# Patient Record
Sex: MALE | Race: BLACK OR AFRICAN AMERICAN
[De-identification: names, ages, dates, MRNs, and addresses within clinical notes are randomized per-mention and may not be internally consistent; named-entity substitution may affect disease eponyms.]

---

## 2018-04-06 ENCOUNTER — HOSPITAL ENCOUNTER (INPATIENT)
Dept: HOSPITAL 12 - ER | Age: 78
LOS: 6 days | Discharge: NURSING, REHAB OR LONG-TERM CARE | DRG: 885 | End: 2018-04-12
Attending: INTERNAL MEDICINE
Payer: MEDICARE

## 2018-04-06 VITALS — WEIGHT: 263 LBS | HEIGHT: 70 IN | BODY MASS INDEX: 37.65 KG/M2

## 2018-04-06 VITALS — DIASTOLIC BLOOD PRESSURE: 86 MMHG | SYSTOLIC BLOOD PRESSURE: 132 MMHG

## 2018-04-06 DIAGNOSIS — B35.3: ICD-10-CM

## 2018-04-06 DIAGNOSIS — F20.0: Primary | ICD-10-CM

## 2018-04-06 DIAGNOSIS — N18.9: ICD-10-CM

## 2018-04-06 DIAGNOSIS — F31.9: ICD-10-CM

## 2018-04-06 DIAGNOSIS — H54.40: ICD-10-CM

## 2018-04-06 DIAGNOSIS — B35.1: ICD-10-CM

## 2018-04-06 DIAGNOSIS — I12.9: ICD-10-CM

## 2018-04-06 DIAGNOSIS — D63.1: ICD-10-CM

## 2018-04-06 DIAGNOSIS — N25.81: ICD-10-CM

## 2018-04-06 DIAGNOSIS — R26.2: ICD-10-CM

## 2018-04-06 DIAGNOSIS — E44.0: ICD-10-CM

## 2018-04-06 DIAGNOSIS — E11.51: ICD-10-CM

## 2018-04-06 DIAGNOSIS — E78.5: ICD-10-CM

## 2018-04-06 DIAGNOSIS — F41.9: ICD-10-CM

## 2018-04-06 DIAGNOSIS — E66.9: ICD-10-CM

## 2018-04-06 DIAGNOSIS — Z79.899: ICD-10-CM

## 2018-04-06 DIAGNOSIS — E11.22: ICD-10-CM

## 2018-04-06 DIAGNOSIS — E11.65: ICD-10-CM

## 2018-04-06 LAB
ALP SERPL-CCNC: 112 U/L (ref 50–136)
ALT SERPL W/O P-5'-P-CCNC: 20 U/L (ref 16–63)
AMPHETAMINES UR QL SCN>1000 NG/ML: NEGATIVE
APAP SERPL-MCNC: < 2 UG/ML (ref 10–30)
APPEARANCE UR: CLEAR
AST SERPL-CCNC: 9 U/L (ref 15–37)
BARBITURATES UR QL SCN: NEGATIVE
BASOPHILS # BLD AUTO: 0.1 K/UL (ref 0–8)
BASOPHILS NFR BLD AUTO: 0.9 % (ref 0–2)
BILIRUB DIRECT SERPL-MCNC: 0.1 MG/DL (ref 0–0.2)
BILIRUB SERPL-MCNC: 0.3 MG/DL (ref 0.2–1)
BILIRUB UR QL STRIP: NEGATIVE
BUN SERPL-MCNC: 54 MG/DL (ref 7–18)
CHLORIDE SERPL-SCNC: 102 MMOL/L (ref 98–107)
CO2 SERPL-SCNC: 19 MMOL/L (ref 21–32)
COCAINE UR QL SCN: NEGATIVE
COLOR UR: YELLOW
CREAT SERPL-MCNC: 4.1 MG/DL (ref 0.6–1.3)
EOSINOPHIL # BLD AUTO: 0.1 K/UL (ref 0–0.7)
EOSINOPHIL NFR BLD AUTO: 0.8 % (ref 0–7)
ETHANOL SERPL-MCNC: < 3 MG/DL (ref 0–0)
GLUCOSE SERPL-MCNC: 124 MG/DL (ref 74–106)
GLUCOSE UR STRIP-MCNC: NEGATIVE MG/DL
HCT VFR BLD AUTO: 36.3 % (ref 36.7–47.1)
HGB BLD-MCNC: 12 G/DL (ref 12.5–16.3)
HGB UR QL STRIP: (no result)
KETONES UR STRIP-MCNC: NEGATIVE MG/DL
LEUKOCYTE ESTERASE UR QL STRIP: NEGATIVE
LYMPHOCYTES # BLD AUTO: 1.4 K/UL (ref 20–40)
LYMPHOCYTES NFR BLD AUTO: 16.5 % (ref 20.5–51.5)
MCH RBC QN AUTO: 28.6 UUG (ref 23.8–33.4)
MCHC RBC AUTO-ENTMCNC: 33 G/DL (ref 32.5–36.3)
MCV RBC AUTO: 86.3 FL (ref 73–96.2)
MONOCYTES # BLD AUTO: 0.6 K/UL (ref 2–10)
MONOCYTES NFR BLD AUTO: 6.4 % (ref 0–11)
MUCOUS THREADS URNS QL MICRO: (no result) /LPF
NEUTROPHILS # BLD AUTO: 6.6 K/UL (ref 1.8–8.9)
NEUTROPHILS NFR BLD AUTO: 75.4 % (ref 38.5–71.5)
NITRITE UR QL STRIP: NEGATIVE
OPIATES UR QL SCN: NEGATIVE
PCP UR QL SCN>25 NG/ML: NEGATIVE
PH UR STRIP: 5.5 [PH] (ref 5–8)
PLATELET # BLD AUTO: 278 K/UL (ref 152–348)
POTASSIUM SERPL-SCNC: 4.4 MMOL/L (ref 3.5–5.1)
PROT UR QL STRIP: (no result)
RBC # BLD AUTO: 4.2 MIL/UL (ref 4.06–5.63)
SP GR UR STRIP: 1.02 (ref 1–1.03)
THC UR QL SCN>50 NG/ML: NEGATIVE
UROBILINOGEN UR STRIP-MCNC: 0.2 E.U./DL
WBC # BLD AUTO: 8.7 K/UL (ref 3.6–10.2)
WBC #/AREA URNS HPF: (no result) /HPF (ref 0–3)
WS STN SPEC: 8.1 G/DL (ref 6.4–8.2)

## 2018-04-06 PROCEDURE — G0480 DRUG TEST DEF 1-7 CLASSES: HCPCS

## 2018-04-06 PROCEDURE — A4663 DIALYSIS BLOOD PRESSURE CUFF: HCPCS

## 2018-04-06 NOTE — NUR
PATIENT IS CALM NOW, NO FURTHER YELLING OR SUDDEN MOVEMENTS.  SP02 >99 % ON RA. 
 HE IS SITTING UP QUIETLY WATCVHING TV.  AWAITING TEST RESULTS.

## 2018-04-06 NOTE — NUR
PATIENT HERE FROM A NURSING FACILITY.  HE IS AWAKE. HE IS COMBATIVE AND YELLING 
OUT INAPPRORIATE THINGS.   HE IS IN RESTRAINTS FROM THE AMBULANCE.  HE IS GOING 
TO ROOM 5.

## 2018-04-06 NOTE — NUR
Gps/Lvn- Received patient from ER via [facility].Patient is lethargic, difficulty following 
directions. Incontinent of urine noted, diaper applied. Speech slurred, when tried to 
awakened patient, unable to weight patient at this time, r/t lethargy, unable to follow 
directions. Bed alarm on. Called Norma FLOYD, left message. Routine admission care 
done.Unable to feed patient lethargic.MRSA swab done, urine specimen obtained sent to lab.

## 2018-04-07 VITALS — SYSTOLIC BLOOD PRESSURE: 147 MMHG | DIASTOLIC BLOOD PRESSURE: 86 MMHG

## 2018-04-07 VITALS — DIASTOLIC BLOOD PRESSURE: 89 MMHG | SYSTOLIC BLOOD PRESSURE: 146 MMHG

## 2018-04-07 VITALS — DIASTOLIC BLOOD PRESSURE: 75 MMHG | SYSTOLIC BLOOD PRESSURE: 160 MMHG

## 2018-04-07 VITALS — SYSTOLIC BLOOD PRESSURE: 164 MMHG | DIASTOLIC BLOOD PRESSURE: 84 MMHG

## 2018-04-07 LAB
ALP SERPL-CCNC: 106 U/L (ref 50–136)
ALT SERPL W/O P-5'-P-CCNC: 21 U/L (ref 16–63)
AST SERPL-CCNC: 22 U/L (ref 15–37)
BASOPHILS # BLD AUTO: 0 K/UL (ref 0–8)
BASOPHILS NFR BLD AUTO: 0.5 % (ref 0–2)
BILIRUB SERPL-MCNC: 0.3 MG/DL (ref 0.2–1)
BUN SERPL-MCNC: 53 MG/DL (ref 7–18)
CHLORIDE SERPL-SCNC: 99 MMOL/L (ref 98–107)
CHOLEST SERPL-MCNC: 172 MG/DL (ref ?–200)
CO2 SERPL-SCNC: 22 MMOL/L (ref 21–32)
CREAT SERPL-MCNC: 4.1 MG/DL (ref 0.6–1.3)
EOSINOPHIL # BLD AUTO: 0.2 K/UL (ref 0–0.7)
EOSINOPHIL NFR BLD AUTO: 2 % (ref 0–7)
GLUCOSE SERPL-MCNC: 82 MG/DL (ref 74–106)
HCT VFR BLD AUTO: 36.1 % (ref 36.7–47.1)
HDLC SERPL-MCNC: 40 MG/DL (ref 40–60)
HGB BLD-MCNC: 11.8 G/DL (ref 12.5–16.3)
LYMPHOCYTES # BLD AUTO: 2.2 K/UL (ref 20–40)
LYMPHOCYTES NFR BLD AUTO: 26.7 % (ref 20.5–51.5)
MAGNESIUM SERPL-MCNC: 2.4 MG/DL (ref 1.8–2.4)
MCH RBC QN AUTO: 28.7 UUG (ref 23.8–33.4)
MCHC RBC AUTO-ENTMCNC: 33 G/DL (ref 32.5–36.3)
MCV RBC AUTO: 88.1 FL (ref 73–96.2)
MONOCYTES # BLD AUTO: 0.6 K/UL (ref 2–10)
MONOCYTES NFR BLD AUTO: 7.1 % (ref 0–11)
NEUTROPHILS # BLD AUTO: 5.2 K/UL (ref 1.8–8.9)
NEUTROPHILS NFR BLD AUTO: 63.7 % (ref 38.5–71.5)
PHOSPHATE SERPL-MCNC: 4.1 MG/DL (ref 2.5–4.9)
PLATELET # BLD AUTO: 287 K/UL (ref 152–348)
POTASSIUM SERPL-SCNC: 4.8 MMOL/L (ref 3.5–5.1)
RBC # BLD AUTO: 4.09 MIL/UL (ref 4.06–5.63)
TRIGL SERPL-MCNC: 89 MG/DL (ref 30–150)
TSH SERPL DL<=0.005 MIU/L-ACNC: 0.75 MIU/ML (ref 0.36–3.74)
VALPROATE SERPL-MCNC: < 3 UG/ML (ref 50–100)
WBC # BLD AUTO: 8.1 K/UL (ref 3.6–10.2)
WS STN SPEC: 8.1 G/DL (ref 6.4–8.2)

## 2018-04-07 RX ADMIN — QUETIAPINE SCH MG: 25 TABLET, FILM COATED ORAL at 12:00

## 2018-04-07 RX ADMIN — LORAZEPAM PRN MG: 0.5 TABLET ORAL at 15:19

## 2018-04-07 RX ADMIN — QUETIAPINE SCH MG: 25 TABLET, FILM COATED ORAL at 17:00

## 2018-04-07 RX ADMIN — VITAMIN D, TAB 1000IU (100/BT) SCH UNIT: 25 TAB at 08:44

## 2018-04-07 RX ADMIN — NIFEDIPINE SCH MG: 30 TABLET, FILM COATED, EXTENDED RELEASE ORAL at 16:19

## 2018-04-07 RX ADMIN — ACETAMINOPHEN PRN MG: 325 TABLET ORAL at 22:05

## 2018-04-07 RX ADMIN — POLYETHYLENE GLYCOL 3350 SCH GM: 17 POWDER, FOR SOLUTION ORAL at 08:44

## 2018-04-07 RX ADMIN — ACETAMINOPHEN PRN MG: 325 TABLET ORAL at 15:10

## 2018-04-07 RX ADMIN — ATORVASTATIN CALCIUM SCH MG: 10 TABLET, FILM COATED ORAL at 21:15

## 2018-04-07 RX ADMIN — NIFEDIPINE SCH MG: 30 TABLET, FILM COATED, EXTENDED RELEASE ORAL at 08:44

## 2018-04-07 RX ADMIN — DIVALPROEX SODIUM SCH MG: 500 TABLET, EXTENDED RELEASE ORAL at 21:00

## 2018-04-07 RX ADMIN — FERROUS SULFATE TAB 325 MG (65 MG ELEMENTAL FE) SCH MG: 325 (65 FE) TAB at 08:44

## 2018-04-07 RX ADMIN — LORAZEPAM PRN MG: 0.5 TABLET ORAL at 08:44

## 2018-04-08 VITALS — DIASTOLIC BLOOD PRESSURE: 57 MMHG | SYSTOLIC BLOOD PRESSURE: 107 MMHG

## 2018-04-08 VITALS — DIASTOLIC BLOOD PRESSURE: 73 MMHG | SYSTOLIC BLOOD PRESSURE: 133 MMHG

## 2018-04-08 VITALS — DIASTOLIC BLOOD PRESSURE: 80 MMHG | SYSTOLIC BLOOD PRESSURE: 146 MMHG

## 2018-04-08 VITALS — DIASTOLIC BLOOD PRESSURE: 83 MMHG | SYSTOLIC BLOOD PRESSURE: 152 MMHG

## 2018-04-08 LAB
ALP SERPL-CCNC: 100 U/L (ref 50–136)
ALT SERPL W/O P-5'-P-CCNC: 20 U/L (ref 16–63)
AST SERPL-CCNC: 16 U/L (ref 15–37)
BASOPHILS # BLD AUTO: 0 K/UL (ref 0–8)
BASOPHILS NFR BLD AUTO: 0.5 % (ref 0–2)
BILIRUB SERPL-MCNC: 0.3 MG/DL (ref 0.2–1)
BUN SERPL-MCNC: 52 MG/DL (ref 7–18)
CHLORIDE SERPL-SCNC: 102 MMOL/L (ref 98–107)
CK SERPL-CCNC: 441 U/L (ref 39–308)
CO2 SERPL-SCNC: 21 MMOL/L (ref 21–32)
CREAT SERPL-MCNC: 4.1 MG/DL (ref 0.6–1.3)
EOSINOPHIL # BLD AUTO: 0.1 K/UL (ref 0–0.7)
EOSINOPHIL NFR BLD AUTO: 1.5 % (ref 0–7)
FERRITIN SERPL-MCNC: 248 NG/ML (ref 26–388)
GLUCOSE SERPL-MCNC: 97 MG/DL (ref 74–106)
HCT VFR BLD AUTO: 35.1 % (ref 36.7–47.1)
HGB BLD-MCNC: 11.4 G/DL (ref 12.5–16.3)
IRON SERPL-MCNC: 24 UG/DL (ref 50–175)
LYMPHOCYTES # BLD AUTO: 1.6 K/UL (ref 20–40)
LYMPHOCYTES NFR BLD AUTO: 19.6 % (ref 20.5–51.5)
MAGNESIUM SERPL-MCNC: 2.3 MG/DL (ref 1.8–2.4)
MCH RBC QN AUTO: 28.4 UUG (ref 23.8–33.4)
MCHC RBC AUTO-ENTMCNC: 33 G/DL (ref 32.5–36.3)
MCV RBC AUTO: 87.5 FL (ref 73–96.2)
MONOCYTES # BLD AUTO: 0.7 K/UL (ref 2–10)
MONOCYTES NFR BLD AUTO: 8.7 % (ref 0–11)
NEUTROPHILS # BLD AUTO: 5.8 K/UL (ref 1.8–8.9)
NEUTROPHILS NFR BLD AUTO: 69.7 % (ref 38.5–71.5)
PHOSPHATE SERPL-MCNC: 4 MG/DL (ref 2.5–4.9)
PLATELET # BLD AUTO: 284 K/UL (ref 152–348)
POTASSIUM SERPL-SCNC: 4.7 MMOL/L (ref 3.5–5.1)
RBC # BLD AUTO: 4.01 MIL/UL (ref 4.06–5.63)
WBC # BLD AUTO: 8.3 K/UL (ref 3.6–10.2)
WS STN SPEC: 7.7 G/DL (ref 6.4–8.2)

## 2018-04-08 RX ADMIN — DIVALPROEX SODIUM SCH MG: 500 TABLET, EXTENDED RELEASE ORAL at 21:00

## 2018-04-08 RX ADMIN — ACETAMINOPHEN PRN MG: 325 TABLET ORAL at 11:14

## 2018-04-08 RX ADMIN — NIFEDIPINE SCH MG: 30 TABLET, FILM COATED, EXTENDED RELEASE ORAL at 08:58

## 2018-04-08 RX ADMIN — VITAMIN D, TAB 1000IU (100/BT) SCH UNIT: 25 TAB at 08:57

## 2018-04-08 RX ADMIN — FERROUS SULFATE TAB 325 MG (65 MG ELEMENTAL FE) SCH MG: 325 (65 FE) TAB at 08:57

## 2018-04-08 RX ADMIN — POLYETHYLENE GLYCOL 3350 SCH GM: 17 POWDER, FOR SOLUTION ORAL at 08:59

## 2018-04-08 RX ADMIN — QUETIAPINE SCH MG: 25 TABLET, FILM COATED ORAL at 08:58

## 2018-04-08 RX ADMIN — QUETIAPINE SCH MG: 25 TABLET, FILM COATED ORAL at 16:48

## 2018-04-08 RX ADMIN — ATORVASTATIN CALCIUM SCH MG: 10 TABLET, FILM COATED ORAL at 21:00

## 2018-04-08 NOTE — NUR
PATIENT SLEPT INTERMITTENTLY THROUGH THE NIGHT FOR APPROX. 5HRS. WILL CONTINUE TO MONITOR 
FOR HIS SAFETY

## 2018-04-08 NOTE — NUR
RECEIVED PATIENT IN THE DAY ROOM. HE IS NOTED A/O X 2. HE IS ABLE TO AMBULATE WITH THE AID 
OF A FWW. PATIENT IS NOTED EASILY IRRITABLE, DISORGANIZED. HE IS RESPONDING TO INTERNAL 
STIMULI. HAVING AH AND POSSIBLE V/A. PATIENT REFUSED ALL HIS QHS MEDICATION. MULTIPLE 
REDIRECTION GIVEN, HOWEVER, INEFFECTIVE. PATIENT STATED, "I ONLY TAKE MEDICATION TWICE A 
DAY, GET THE FUCK OUT OF MY ROOM." MULTIPLE REDIRECTION GIVEN. PT IS ALSO NOTED WITHDRAWN, 
LABILE MOOD. SAFETY WAS EMPHASIS. WILL CONTINUE TO MONITOR.

## 2018-04-09 VITALS — SYSTOLIC BLOOD PRESSURE: 142 MMHG | DIASTOLIC BLOOD PRESSURE: 82 MMHG

## 2018-04-09 VITALS — SYSTOLIC BLOOD PRESSURE: 188 MMHG | DIASTOLIC BLOOD PRESSURE: 80 MMHG

## 2018-04-09 VITALS — DIASTOLIC BLOOD PRESSURE: 72 MMHG | SYSTOLIC BLOOD PRESSURE: 148 MMHG

## 2018-04-09 LAB
APPEARANCE UR: CLEAR
APPEARANCE UR: CLEAR
BILIRUB UR QL STRIP: NEGATIVE
BILIRUB UR QL STRIP: NEGATIVE
COLOR UR: YELLOW
COLOR UR: YELLOW
CREAT UR-MCNC: 54.1 MG/DL (ref 30–125)
DEPRECATED SQUAMOUS URNS QL MICRO: (no result) /HPF
DEPRECATED SQUAMOUS URNS QL MICRO: (no result) /HPF
GLUCOSE UR STRIP-MCNC: (no result) MG/DL
GLUCOSE UR STRIP-MCNC: (no result) MG/DL
HGB UR QL STRIP: (no result)
HGB UR QL STRIP: (no result)
KETONES UR STRIP-MCNC: NEGATIVE MG/DL
KETONES UR STRIP-MCNC: NEGATIVE MG/DL
LEUKOCYTE ESTERASE UR QL STRIP: NEGATIVE
LEUKOCYTE ESTERASE UR QL STRIP: NEGATIVE
NITRITE UR QL STRIP: NEGATIVE
NITRITE UR QL STRIP: NEGATIVE
PH UR STRIP: 5.5 [PH] (ref 5–8)
PH UR STRIP: 5.5 [PH] (ref 5–8)
PROT UR QL STRIP: (no result)
PROT UR QL STRIP: (no result)
PROT UR-MCNC: 343.7 MG/DL
SP GR UR STRIP: 1.02 (ref 1–1.03)
SP GR UR STRIP: 1.02 (ref 1–1.03)
UROBILINOGEN UR STRIP-MCNC: 0.2 E.U./DL
UROBILINOGEN UR STRIP-MCNC: 0.2 E.U./DL
WBC #/AREA URNS HPF: (no result) /HPF
WBC #/AREA URNS HPF: (no result) /HPF
WBC #/AREA URNS HPF: (no result) /HPF (ref 0–3)
WBC #/AREA URNS HPF: (no result) /HPF (ref 0–3)

## 2018-04-09 RX ADMIN — LORAZEPAM PRN MG: 0.5 TABLET ORAL at 21:07

## 2018-04-09 RX ADMIN — POLYETHYLENE GLYCOL 3350 SCH GM: 17 POWDER, FOR SOLUTION ORAL at 09:49

## 2018-04-09 RX ADMIN — ACETAMINOPHEN PRN MG: 325 TABLET ORAL at 09:49

## 2018-04-09 RX ADMIN — OLANZAPINE SCH MG: 2.5 TABLET ORAL at 17:20

## 2018-04-09 RX ADMIN — QUETIAPINE SCH MG: 25 TABLET, FILM COATED ORAL at 09:00

## 2018-04-09 RX ADMIN — DIVALPROEX SODIUM SCH MG: 500 TABLET, EXTENDED RELEASE ORAL at 21:00

## 2018-04-09 RX ADMIN — FERROUS SULFATE TAB 325 MG (65 MG ELEMENTAL FE) SCH MG: 325 (65 FE) TAB at 09:48

## 2018-04-09 RX ADMIN — VITAMIN D, TAB 1000IU (100/BT) SCH UNIT: 25 TAB at 09:49

## 2018-04-09 RX ADMIN — ATORVASTATIN CALCIUM SCH MG: 10 TABLET, FILM COATED ORAL at 21:00

## 2018-04-09 RX ADMIN — Medication SCH MG: at 09:00

## 2018-04-09 NOTE — NUR
PATIENT SLEPT FOR APPROX 1.30HRS THROUGH THE NIGHT. HE REFUSED TO GIVE URINE SAMPLE AND IS 
REFUSING TO GIVE STOOL SAMPLE. PT CONTINUE WITH LABILE BX. RESPONDING TO EXTERNAL STIMULI. 
PRESSURE SPEECH, LOUD AT TIME. WILL CONTINUE TO MONITOR.

## 2018-04-09 NOTE — NUR
Initial Discharge Instructions:  Patient currently resides at [facility] [[address] 
[place] [address]; [phone]]. Pt states that he would not like to return there. 
DENYS will speak with pt's LPS Conservator, Claude Perdue ([phone]). DENYS will 
collaborate with pt, Conservator, and MD regarding appropriate discharge plans for this 
patient. SW will form a safe and proper discharge plan.

## 2018-04-10 VITALS — SYSTOLIC BLOOD PRESSURE: 133 MMHG | DIASTOLIC BLOOD PRESSURE: 70 MMHG

## 2018-04-10 VITALS — DIASTOLIC BLOOD PRESSURE: 74 MMHG | SYSTOLIC BLOOD PRESSURE: 119 MMHG

## 2018-04-10 VITALS — DIASTOLIC BLOOD PRESSURE: 99 MMHG | SYSTOLIC BLOOD PRESSURE: 167 MMHG

## 2018-04-10 LAB
ALBUMIN SERPL ELPH-MCNC: 3.2 G/DL (ref 2.9–4.4)
ALBUMIN/GLOB SERPL: 0.8 {RATIO} (ref 0.7–1.7)
ALPHA1 GLOB SERPL ELPH-MCNC: 0.3 G/DL (ref 0–0.4)
ALPHA2 GLOB SERPL ELPH-MCNC: 0.9 G/DL (ref 0.4–1)
B-GLOBULIN SERPL ELPH-MCNC: 1.4 G/DL (ref 0.7–1.3)
GAMMA GLOB SERPL ELPH-MCNC: 1.3 G/DL (ref 0.4–1.8)
GLOBULIN SER CALC-MCNC: 3.9 G/DL (ref 2.2–3.9)

## 2018-04-10 PROCEDURE — 0HBRXZZ EXCISION OF TOE NAIL, EXTERNAL APPROACH: ICD-10-PCS

## 2018-04-10 RX ADMIN — Medication SCH MG: at 09:02

## 2018-04-10 RX ADMIN — ATORVASTATIN CALCIUM SCH MG: 10 TABLET, FILM COATED ORAL at 21:00

## 2018-04-10 RX ADMIN — DIVALPROEX SODIUM SCH MG: 500 TABLET, EXTENDED RELEASE ORAL at 21:00

## 2018-04-10 RX ADMIN — OLANZAPINE SCH MG: 2.5 TABLET ORAL at 09:01

## 2018-04-10 RX ADMIN — POLYETHYLENE GLYCOL 3350 SCH GM: 17 POWDER, FOR SOLUTION ORAL at 09:02

## 2018-04-10 RX ADMIN — OLANZAPINE SCH MG: 5 TABLET ORAL at 16:56

## 2018-04-10 RX ADMIN — VITAMIN D, TAB 1000IU (100/BT) SCH UNIT: 25 TAB at 09:02

## 2018-04-10 RX ADMIN — FERROUS SULFATE TAB 325 MG (65 MG ELEMENTAL FE) SCH MG: 325 (65 FE) TAB at 09:01

## 2018-04-10 NOTE — NUR
RECEIVED PATIENT IN THE DAY ROOM. HE IS NOTED A/O X 2, HE IS ABLE TO AMBULATE WITH THE AID 
OF A FWW AND ABLE TO MAKE HIS NEEDS KNOWN. HE IS NOTED EXTREMELY IRRITABLE, RESISTANT TO 
CARE. UNABLE TO BE REDIRECTED, VERBALLY ABUSIVE, LABILE MOOD, BLUNTED AFFECT. HE REFUSED QHS 
MEDICATION. V/S STABLE AT THIS TIME. SAFETY WAS EMPHASIS.

## 2018-04-10 NOTE — NUR
Discharge Planning Note: DENYS spoke with patient's psychiatrist Dr. Barnes who stated he 
would like to discharge patient tomorrow. SW attempted to call patient's facility [facility] to alert them of patient's discharge. SW left a voicemail for admissions 
[phone]] and will continue to follow up.

## 2018-04-10 NOTE — NUR
NURSING NOTE:A/O X2,ANXIOUS,RESTLESS,SUSPICIOUS AND EASILY IRRITABLE/ANGRY UPON 
APPROACH.REFUSED TO TAKE HS MEDS,BACAME MORE ANGRY WHEN STAFF WAS ATTEMPTING TO OFFER HIM 
SEVERAL TIMES.HE STARTEDYELLING,SCREAMING,

CURSING AND THREATENING WHEN STAFFS WERE COME CLOSER TO TALK /EXPLAIN TO HIM.HIS BP WAS HIGH 
188/80 PE=196-[facility] MED WAS OFFERRED BUT PT STILL REFUSED TO TAKE IT. WAS NOTIFIED WITH 
NEW ORDER FOR CLONIDINE GTT1 PATCH TO APPLY Q 7 DAYS.2 SECURITY GUARDS WERE PRESENTED @ THE 
TIME OF FRANCIE PATCH TO HIS MID BACK DUE TO RESISTIVE WITH CARE.PT SLEPT ON & 
OFF,RESPONDING TO INTERNAL STIMULI,"I'M TALKING TO  NOT YOU STUPID WHORE 
SUCKER!".CALM DOWN WHEN LEFT ALONE BUT STILL MUMBLING TO HIMSELF.POOR INSIGHT AND IMPAIRED 
JUDEMENT.WILL CONTINUE TO MONITOR.

## 2018-04-10 NOTE — NUR
PATIENT CONTINUE IRRITABLE, LABILE BX. EASILY AGITATIVE, LOUD VOICE. HE IS NOTED TALKING TO 
HIMSELF AND RESPONDING TO INTERNAL STIMULI. HE ASKED FOR PAIN MEDICATION; WHEN ASKED ABOUT 
THE NATURE AND INTENSITY OF HIS PAIN, HE BECAME BELLIGERENT AND HE STATED, "WHAT KIND OF 
QUESTION IS THAT!!!... YOU ARE A NURSE, YOU FIND OUT!!! LOOK AT MY FILE. WHAT QUESTION OS 
THAT". THEN PATIENT WAS OFFERED TYLENOL; HOWEVER, HE REFUSED, HE STATED. "I DON'T HAVE PAIN, 
YOU TAKE IT AND GET OUT OF MY ROOM!!!" WILL CONTINUE TO MONITOR CLOSELY.

## 2018-04-11 VITALS — DIASTOLIC BLOOD PRESSURE: 81 MMHG | SYSTOLIC BLOOD PRESSURE: 131 MMHG

## 2018-04-11 VITALS — SYSTOLIC BLOOD PRESSURE: 141 MMHG | DIASTOLIC BLOOD PRESSURE: 74 MMHG

## 2018-04-11 VITALS — DIASTOLIC BLOOD PRESSURE: 72 MMHG | SYSTOLIC BLOOD PRESSURE: 137 MMHG

## 2018-04-11 RX ADMIN — OLANZAPINE SCH MG: 5 TABLET ORAL at 17:00

## 2018-04-11 RX ADMIN — POLYETHYLENE GLYCOL 3350 SCH GM: 17 POWDER, FOR SOLUTION ORAL at 09:00

## 2018-04-11 RX ADMIN — Medication SCH MG: at 09:00

## 2018-04-11 RX ADMIN — ATORVASTATIN CALCIUM SCH MG: 10 TABLET, FILM COATED ORAL at 21:00

## 2018-04-11 RX ADMIN — DIVALPROEX SODIUM SCH MG: 500 TABLET, EXTENDED RELEASE ORAL at 21:00

## 2018-04-11 RX ADMIN — FERROUS SULFATE TAB 325 MG (65 MG ELEMENTAL FE) SCH MG: 325 (65 FE) TAB at 09:14

## 2018-04-11 RX ADMIN — VITAMIN D, TAB 1000IU (100/BT) SCH UNIT: 25 TAB at 09:00

## 2018-04-11 RX ADMIN — FERROUS SULFATE TAB 325 MG (65 MG ELEMENTAL FE) SCH MG: 325 (65 FE) TAB at 09:00

## 2018-04-11 RX ADMIN — OLANZAPINE PRN MG: 10 INJECTION, POWDER, FOR SOLUTION INTRAMUSCULAR at 09:22

## 2018-04-11 RX ADMIN — OLANZAPINE SCH MG: 5 TABLET ORAL at 09:15

## 2018-04-11 RX ADMIN — Medication SCH MG: at 09:14

## 2018-04-11 RX ADMIN — POLYETHYLENE GLYCOL 3350 SCH GM: 17 POWDER, FOR SOLUTION ORAL at 09:15

## 2018-04-11 RX ADMIN — OLANZAPINE PRN MG: 10 INJECTION, POWDER, FOR SOLUTION INTRAMUSCULAR at 17:40

## 2018-04-11 RX ADMIN — VITAMIN D, TAB 1000IU (100/BT) SCH UNIT: 25 TAB at 09:15

## 2018-04-11 NOTE — NUR
RECEIVED PATIENT IN THE HALLWAY. HE IS NOTED A/O X 2. HE IS ABLE TO AMBULATE WITH STEADY 
GAIT WITH THE AID OF FWW. HE CONTINUE REFUSING DEPAKOTE AND LIPITOR QHS. HE IS NOTED EASILY 
IRRITABLE, LABILE, BELLIGERENT AND LOUD. CONTINUE TALKING TO HIMSELF. SUSPICIOUS, AND 
WITHDRAWN. HE REMAINS UNABLE TO VERBALIZED FEELINGS. POOR INSIGHT AND JUDGMENT. SAFETY 
EMPHASIS, CONTINUE TO MONITOR PT CLOSELY.

## 2018-04-11 NOTE — NUR
PATIENT REMAINS VERY AGITATED AND LOUD YELLING HAD TO GIVE 2  zyprexa im s for agitation 
with assist of security. PATIENT VERY COMBATIVE TOWARDS STAFF AND PEERS CURSING AT TOP OF 
VOICE CONTINUE TO MONITOR FOR SAFETY

## 2018-04-12 LAB
ALP SERPL-CCNC: 95 U/L (ref 50–136)
ALT SERPL W/O P-5'-P-CCNC: 20 U/L (ref 16–63)
AST SERPL-CCNC: 11 U/L (ref 15–37)
BASOPHILS # BLD AUTO: 0 K/UL (ref 0–8)
BASOPHILS NFR BLD AUTO: 0.8 % (ref 0–2)
BILIRUB SERPL-MCNC: 0.3 MG/DL (ref 0.2–1)
BUN SERPL-MCNC: 71 MG/DL (ref 7–18)
CHLORIDE SERPL-SCNC: 101 MMOL/L (ref 98–107)
CO2 SERPL-SCNC: 21 MMOL/L (ref 21–32)
CREAT SERPL-MCNC: 4.2 MG/DL (ref 0.6–1.3)
EOSINOPHIL # BLD AUTO: 0.2 K/UL (ref 0–0.7)
EOSINOPHIL NFR BLD AUTO: 2.5 % (ref 0–7)
GLUCOSE SERPL-MCNC: 101 MG/DL (ref 74–106)
HCT VFR BLD AUTO: 34.2 % (ref 36.7–47.1)
HGB BLD-MCNC: 11.3 G/DL (ref 12.5–16.3)
LYMPHOCYTES # BLD AUTO: 1.9 K/UL (ref 20–40)
LYMPHOCYTES NFR BLD AUTO: 31.8 % (ref 20.5–51.5)
MAGNESIUM SERPL-MCNC: 2.6 MG/DL (ref 1.8–2.4)
MCH RBC QN AUTO: 28.9 UUG (ref 23.8–33.4)
MCHC RBC AUTO-ENTMCNC: 33 G/DL (ref 32.5–36.3)
MCV RBC AUTO: 87.4 FL (ref 73–96.2)
MONOCYTES # BLD AUTO: 0.5 K/UL (ref 2–10)
MONOCYTES NFR BLD AUTO: 7.9 % (ref 0–11)
NEUTROPHILS # BLD AUTO: 3.5 K/UL (ref 1.8–8.9)
NEUTROPHILS NFR BLD AUTO: 57 % (ref 38.5–71.5)
PHOSPHATE SERPL-MCNC: 4.7 MG/DL (ref 2.5–4.9)
PLATELET # BLD AUTO: 359 K/UL (ref 152–348)
POTASSIUM SERPL-SCNC: 5.1 MMOL/L (ref 3.5–5.1)
RBC # BLD AUTO: 3.91 MIL/UL (ref 4.06–5.63)
WBC # BLD AUTO: 6.1 K/UL (ref 3.6–10.2)
WS STN SPEC: 8.1 G/DL (ref 6.4–8.2)

## 2018-04-12 RX ADMIN — FERROUS SULFATE TAB 325 MG (65 MG ELEMENTAL FE) SCH MG: 325 (65 FE) TAB at 08:56

## 2018-04-12 RX ADMIN — OLANZAPINE PRN MG: 10 INJECTION, POWDER, FOR SOLUTION INTRAMUSCULAR at 09:02

## 2018-04-12 RX ADMIN — OLANZAPINE SCH MG: 5 TABLET ORAL at 08:57

## 2018-04-12 RX ADMIN — POLYETHYLENE GLYCOL 3350 SCH GM: 17 POWDER, FOR SOLUTION ORAL at 08:56

## 2018-04-12 RX ADMIN — VITAMIN D, TAB 1000IU (100/BT) SCH UNIT: 25 TAB at 08:56

## 2018-04-12 RX ADMIN — Medication SCH MG: at 08:56

## 2018-04-12 NOTE — NUR
NOTED PT TALKS TO SELF, AND ANXIOUS. OFFERED ATIVAN, PT STRONGLY REFUSED AND STATED "GET 
AWAY". WILL MONITOR.

## 2018-04-12 NOTE — NUR
1350: DISCHARGED PT VIA AMBULANCE TO [facility]. DISCHARGE INSTRUCTIONS AND 
BELONGINGS SENT WITH THE PATIENT. NOT IN ACUTE DISTRESS.

## 2018-04-12 NOTE — NUR
PATIENT NOTED AWAKE, SITTING IN HIS BED, LIGHTS ON. HE IS NOTED TALKING TO HIMSELF AND 
RESPONDING TO EXTERNAL STIMULI.

## 2018-04-12 NOTE — NUR
DC Note:  Patient will be discharged back to [facility] [[address], 
[place] [address]; [place] [address]; [phone]] via ambulance. Spoke 
with Ester and Shantel at the facility who state they are ready to accept the patient today. 
Patient will be staying in Room 25. Spoke with pt's LPS conservator, Claude Perdue 
([phone]) who is aware and agreeable with discharge plan. Patient is aware and 
agreeable with discharge plans. Patient will follow-up at the facility with Dr. Gardner 
(Internist) and Dr. Jones (Psychiatrist).

## 2018-04-12 NOTE — NUR
PATIENT SLEPT FOR APPROX 2.30 HRS THROUGH THE NIGHT. HE HAD A SHOWER, HE CONTINUE TALKING TO 
HIMSELF, WORD SALAD, AND EASILY IRRITABLE. HOWEVER, PATIENT ALLOWED A BLOOD DRAWN THIS AM. 
IT WAS ALSO NOTED DURING HIS SHOWER AN ABRASION OF APPROX 0.5CM X 0.5CM IN DIAMETER. NO 
SWELLING OR DISCHARGED NOTED AT THIS TIME. WOUND WAS CLEANED WITH N/S PAT DRY AND A BANDAGE 
WAS PLACED. PHOTO WAS TAKEN AND PLACED IN CHART, WE WILL ENDORSED TO INCOMING SHIFT TO HAVE 
MD EXAM WOUND. WILL CONTINUE TO MONITOR CLOSELY.

## 2019-11-13 ENCOUNTER — HOSPITAL ENCOUNTER (INPATIENT)
Dept: HOSPITAL 12 - ER | Age: 79
LOS: 5 days | Discharge: NURSING, REHAB OR LONG-TERM CARE | DRG: 698 | End: 2019-11-18
Payer: MEDICARE

## 2019-11-13 VITALS — HEIGHT: 75 IN | BODY MASS INDEX: 28.72 KG/M2 | WEIGHT: 231 LBS

## 2019-11-13 VITALS — DIASTOLIC BLOOD PRESSURE: 65 MMHG | SYSTOLIC BLOOD PRESSURE: 129 MMHG

## 2019-11-13 DIAGNOSIS — M89.8X9: ICD-10-CM

## 2019-11-13 DIAGNOSIS — D63.1: ICD-10-CM

## 2019-11-13 DIAGNOSIS — N18.9: ICD-10-CM

## 2019-11-13 DIAGNOSIS — H54.61: ICD-10-CM

## 2019-11-13 DIAGNOSIS — E11.21: Primary | ICD-10-CM

## 2019-11-13 DIAGNOSIS — Z79.899: ICD-10-CM

## 2019-11-13 DIAGNOSIS — E78.5: ICD-10-CM

## 2019-11-13 DIAGNOSIS — Z91.19: ICD-10-CM

## 2019-11-13 DIAGNOSIS — F20.0: ICD-10-CM

## 2019-11-13 DIAGNOSIS — E11.22: ICD-10-CM

## 2019-11-13 DIAGNOSIS — I13.0: ICD-10-CM

## 2019-11-13 DIAGNOSIS — Z91.15: ICD-10-CM

## 2019-11-13 DIAGNOSIS — I50.9: ICD-10-CM

## 2019-11-13 DIAGNOSIS — E44.0: ICD-10-CM

## 2019-11-13 DIAGNOSIS — E66.9: ICD-10-CM

## 2019-11-13 DIAGNOSIS — G93.41: ICD-10-CM

## 2019-11-13 LAB
ALP SERPL-CCNC: 116 U/L (ref 50–136)
ALT SERPL W/O P-5'-P-CCNC: 6 U/L (ref 16–63)
APAP SERPL-MCNC: < 2 UG/ML (ref 10–30)
AST SERPL-CCNC: 6 U/L (ref 15–37)
BASOPHILS # BLD AUTO: 0 K/UL (ref 0–8)
BASOPHILS NFR BLD AUTO: 0.2 % (ref 0–2)
BILIRUB DIRECT SERPL-MCNC: 0.1 MG/DL (ref 0–0.2)
BILIRUB SERPL-MCNC: 0.2 MG/DL (ref 0.2–1)
BUN SERPL-MCNC: 96 MG/DL (ref 7–18)
CHLORIDE SERPL-SCNC: 95 MMOL/L (ref 98–107)
CO2 SERPL-SCNC: 23 MMOL/L (ref 21–32)
CREAT SERPL-MCNC: 8.8 MG/DL (ref 0.6–1.3)
EOSINOPHIL # BLD AUTO: 0.2 K/UL (ref 0–0.7)
EOSINOPHIL NFR BLD AUTO: 1.5 % (ref 0–7)
EOSINOPHIL NFR BLD MANUAL: 1 % (ref 0–8)
ETHANOL SERPL-MCNC: < 3 MG/DL (ref 0–0)
GLUCOSE SERPL-MCNC: 80 MG/DL (ref 74–106)
HCT VFR BLD AUTO: 19.7 % (ref 36.7–47.1)
HGB BLD-MCNC: 6.5 G/DL (ref 12.5–16.3)
LYMPHOCYTES # BLD AUTO: 0.9 K/UL (ref 20–40)
LYMPHOCYTES NFR BLD AUTO: 7.5 % (ref 20.5–51.5)
LYMPHOCYTES NFR BLD MANUAL: 9 % (ref 20–40)
MCH RBC QN AUTO: 26.7 UUG (ref 23.8–33.4)
MCHC RBC AUTO-ENTMCNC: 33 G/DL (ref 32.5–36.3)
MCV RBC AUTO: 80.7 FL (ref 73–96.2)
MONOCYTES # BLD AUTO: 0.8 K/UL (ref 2–10)
MONOCYTES NFR BLD AUTO: 6.7 % (ref 0–11)
MONOCYTES NFR BLD MANUAL: 6 % (ref 2–10)
NEUTROPHILS # BLD AUTO: 10.7 K/UL (ref 1.8–8.9)
NEUTROPHILS NFR BLD AUTO: 84.1 % (ref 38.5–71.5)
NEUTS BAND NFR BLD MANUAL: 7 % (ref 0–10)
NEUTS SEG NFR BLD MANUAL: 77 % (ref 42–75)
PLATELET # BLD AUTO: 294 K/UL (ref 152–348)
POTASSIUM SERPL-SCNC: 4.9 MMOL/L (ref 3.5–5.1)
RBC # BLD AUTO: 2.44 MIL/UL (ref 4.06–5.63)
TSH SERPL DL<=0.005 MIU/L-ACNC: 1.04 MIU/ML (ref 0.36–3.74)
WBC # BLD AUTO: 12.7 K/UL (ref 3.6–10.2)
WS STN SPEC: 8.1 G/DL (ref 6.4–8.2)

## 2019-11-13 PROCEDURE — A4663 DIALYSIS BLOOD PRESSURE CUFF: HCPCS

## 2019-11-13 PROCEDURE — G0378 HOSPITAL OBSERVATION PER HR: HCPCS

## 2019-11-13 PROCEDURE — G0480 DRUG TEST DEF 1-7 CLASSES: HCPCS

## 2019-11-13 NOTE — NUR
PT REFUSED BLOOD TRANSFUSION, AND TYPE AND SCREEN TEST. DR LOCK NOTIFIED. 
REPORT GIVEN TO RN NIGHT SHIFT.

## 2019-11-13 NOTE — NUR
NURSE ADVISED PATIENT OF MD ORDER FOR IV FLUIDS.  PATIENT REFUSED STATING THAT HE DOESN'T 
WANT AND THAT ALL HE NEEDS IS WATER IN THE PITCHER TO DRINK.  NURSE EXPLAINED RISKS BENEFITS 
THREE TIMES, BUT PATIENT STILL REFUSED.

## 2019-11-13 NOTE — NUR
RECEIVED HAND OFF AND SBAR FROM OUTGOING DAY SHIFT RN 

PT IS ON SEMI BRADFORD'S

G18 TO R WRIST

BP: 126/70MMHG 

HR 84 BPM



ERMD ALREADY SPOKEN W/ MILES EDWARDS

TO BE ADMITTED FOR TELE

DX WEAKNESS/ LOW HGB



REFUSES PHLEBOTOMY BLOOD TYPING AND BLOOD TRANSFUSION AND TYPING AT THIS TIME

KEPT WARM DRY AND COMFORTABLE

BELONGINGS LIST SIGNED AND ACCOUNTED FOR

## 2019-11-13 NOTE — NUR
HAND OFF AND SBAR GIVEN TO GUILLERMINA RN

Pt. admitted to  TELE  , under care of Dr. AQUINO

Belongs List completed

## 2019-11-13 NOTE — NUR
Monitor tech advised nurse patient off of telemonitor.  Nurse checked with patient, who had 
removed telemonitor completely.  Nurse explained the need to continue telemonitoring.  
Patient refused.

## 2019-11-13 NOTE — NUR
*** NEW ADMIT NOTES ***  PATIENT RECEIVED INTO CARE FROM ER.  PATIENT IS ALERT/ORIENTED X2 
AND HAS NO COMPLAINTS OF PAIN OR DISCOMFORT AT THIS TIME.  PATIENT IS ABLE TO ANSWER 
QUESTIONS BUT MOST HISTORY IS RECEIVED FROM CAREGIVER, MARTIN, WHO CAME WITH PATIENT TO THE 
FLOOR. PATIENT SKIN IS CLEAN, DRY, AND INTACT.  IV CATH IN RIGHT WRIST IS PATENT AND INTACT. 
 ALL SAFETY AND FALL PRECAUTION MEASURES ARE IN PLACE.  CALL LIGHT AND PERSONAL ITEMS ARE 
WITHIN REACH AT ALL TIMES.

## 2019-11-13 NOTE — NUR
NURSE TRIED REACHING CONSERVATOR OF RECORD, ALFA RALPH -[phone], INRE PATIENT'S 
ADMITTANCE TO HOSPITAL AND NEEDED TREATMENTS WHILE IN OUR CARE.  NURSE RECEIVED MESSAGE THAT 
CONSERVATOR'S VOICEMAIL BOX HAS NOT BEEN SETUP SO UNABLE TO LEAVE MESSAGE.  WILL HAVE AM 
NURSE FOLLOW UP IN THE MORNING.

## 2019-11-14 VITALS — DIASTOLIC BLOOD PRESSURE: 67 MMHG | SYSTOLIC BLOOD PRESSURE: 130 MMHG

## 2019-11-14 VITALS — DIASTOLIC BLOOD PRESSURE: 67 MMHG | SYSTOLIC BLOOD PRESSURE: 134 MMHG

## 2019-11-14 VITALS — DIASTOLIC BLOOD PRESSURE: 79 MMHG | SYSTOLIC BLOOD PRESSURE: 166 MMHG

## 2019-11-14 VITALS — DIASTOLIC BLOOD PRESSURE: 77 MMHG | SYSTOLIC BLOOD PRESSURE: 149 MMHG

## 2019-11-14 VITALS — SYSTOLIC BLOOD PRESSURE: 145 MMHG | DIASTOLIC BLOOD PRESSURE: 81 MMHG

## 2019-11-14 LAB
AMPHETAMINES UR QL SCN>1000 NG/ML: NEGATIVE
APPEARANCE UR: (no result)
BARBITURATES UR QL SCN: NEGATIVE
BASOPHILS # BLD AUTO: 0.1 K/UL (ref 0–8)
BASOPHILS NFR BLD AUTO: 0.8 % (ref 0–2)
BILIRUB UR QL STRIP: NEGATIVE
BUN SERPL-MCNC: 99 MG/DL (ref 7–18)
CHLORIDE SERPL-SCNC: 94 MMOL/L (ref 98–107)
CHOLEST SERPL-MCNC: 83 MG/DL (ref ?–200)
CO2 SERPL-SCNC: 22 MMOL/L (ref 21–32)
COCAINE UR QL SCN: NEGATIVE
COLOR UR: YELLOW
CREAT SERPL-MCNC: 9 MG/DL (ref 0.6–1.3)
DEPRECATED SQUAMOUS URNS QL MICRO: (no result) /HPF
EOSINOPHIL # BLD AUTO: 0.3 K/UL (ref 0–0.7)
EOSINOPHIL NFR BLD AUTO: 2.6 % (ref 0–7)
EOSINOPHIL NFR BLD MANUAL: 1 % (ref 0–8)
GLUCOSE SERPL-MCNC: 98 MG/DL (ref 74–106)
GLUCOSE UR STRIP-MCNC: (no result) MG/DL
HCT VFR BLD AUTO: 20.7 % (ref 36.7–47.1)
HDLC SERPL-MCNC: 44 MG/DL (ref 40–60)
HGB BLD-MCNC: 6.8 G/DL (ref 12.5–16.3)
HGB UR QL STRIP: (no result)
KETONES UR STRIP-MCNC: NEGATIVE MG/DL
LEUKOCYTE ESTERASE UR QL STRIP: NEGATIVE
LYMPHOCYTES # BLD AUTO: 1 K/UL (ref 20–40)
LYMPHOCYTES NFR BLD AUTO: 8.4 % (ref 20.5–51.5)
LYMPHOCYTES NFR BLD MANUAL: 8 % (ref 20–40)
MAGNESIUM SERPL-MCNC: 2.8 MG/DL (ref 1.8–2.4)
MCH RBC QN AUTO: 26.4 UUG (ref 23.8–33.4)
MCHC RBC AUTO-ENTMCNC: 33 G/DL (ref 32.5–36.3)
MCV RBC AUTO: 81 FL (ref 73–96.2)
MONOCYTES # BLD AUTO: 0.6 K/UL (ref 2–10)
MONOCYTES NFR BLD AUTO: 5.3 % (ref 0–11)
MONOCYTES NFR BLD MANUAL: 4 % (ref 2–10)
NEUTROPHILS # BLD AUTO: 10.2 K/UL (ref 1.8–8.9)
NEUTROPHILS NFR BLD AUTO: 82.9 % (ref 38.5–71.5)
NEUTS BAND NFR BLD MANUAL: 1 % (ref 0–10)
NEUTS SEG NFR BLD MANUAL: 86 % (ref 42–75)
NITRITE UR QL STRIP: NEGATIVE
OPIATES UR QL SCN: NEGATIVE
PCP UR QL SCN>25 NG/ML: NEGATIVE
PH UR STRIP: 7.5 [PH] (ref 5–8)
PHOSPHATE SERPL-MCNC: 3.7 MG/DL (ref 2.5–4.9)
PLATELET # BLD AUTO: 348 K/UL (ref 152–348)
POTASSIUM SERPL-SCNC: 5 MMOL/L (ref 3.5–5.1)
RBC # BLD AUTO: 2.56 MIL/UL (ref 4.06–5.63)
RBC #/AREA URNS HPF: (no result) /HPF (ref 0–3)
SP GR UR STRIP: 1.02 (ref 1–1.03)
THC UR QL SCN>50 NG/ML: NEGATIVE
TRIGL SERPL-MCNC: 40 MG/DL (ref 30–150)
UROBILINOGEN UR STRIP-MCNC: 0.2 E.U./DL
WBC # BLD AUTO: 12.4 K/UL (ref 3.6–10.2)
WBC #/AREA URNS HPF: (no result) /HPF
WBC #/AREA URNS HPF: (no result) /HPF (ref 0–3)

## 2019-11-14 RX ADMIN — Medication PRN LOZ: at 20:44

## 2019-11-14 RX ADMIN — ACETAMINOPHEN PRN MG: 325 TABLET ORAL at 11:41

## 2019-11-14 RX ADMIN — RENAGEL SCH MG: 800 TABLET ORAL at 17:00

## 2019-11-14 RX ADMIN — Medication PRN MG: at 20:44

## 2019-11-14 RX ADMIN — SODIUM CITRATE AND CITRIC ACID MONOHYDRATE SCH ML: 500; 334 SOLUTION ORAL at 16:40

## 2019-11-14 RX ADMIN — SENNOSIDES SCH TAB: 8.6 TABLET, COATED ORAL at 16:45

## 2019-11-14 RX ADMIN — NIFEDIPINE SCH MG: 30 TABLET, FILM COATED, EXTENDED RELEASE ORAL at 16:45

## 2019-11-14 RX ADMIN — RENAGEL SCH MG: 800 TABLET ORAL at 12:55

## 2019-11-14 NOTE — NUR
PT RESTING COMFORTABLY IN BED. NO SIGNS OF SOB OR ACUTE DISTRESS NOTED. PT MEDICATION 
COMPLIANT. WILL CONTINUE TO MONITOR.

## 2019-11-14 NOTE — NUR
Patient's BP is 166/79 P-91, no order for BP meds at bedtime and no PRN BP meds, informed 
Chon NP w/ n.o for Hydralazine 25mg PO Q8. Also patient was complaining of sore throat, 
n.o for Lozenges Q2 PRN.

## 2019-11-14 NOTE — NUR
Patient slept intermittently throughout night for approximately 3 hours.  Patient's 
complaint of pain was addressed with prescribed analgesic medication.  Patient was combative 
and agitated for most of night and could be heard talking/yelling/cursing to himself during 
periods of wakefulness. Patient refused to wear diaper to address his toileting needs.  
Patient removed right wrist 18g IV catheter and refused to have new IV catheter reinserted.  
Patient removed telemonitoring box and refused to have it reapplied. All nursing needs were 
met promptly.  All safety measures and fall precaution measures remain in place.  Call light 
and personal items remain within reach at all times.

## 2019-11-14 NOTE — NUR
CONSERVATOR ALFA RALPH CONTACTED. IN ORDER TO CLARIFY DNR STATUS. CONSERVATOR STATED 
THERE IS NO POLST AT THIS TIME. CONSERVATOR STATED THAT PATIENT WENT TO COURT UNDER MENTAL 
HEALTH AND WAS EVALUATED BY A PSYCHIATRIST WHO FOUND PATIENT NOT CAPABLE TO MAKE SUCH 
DECISION. CONSERVATOR STATED THAT AT THIS TIME SHE NEEDS A SPECIAL ORDER FROM THE  IN 
ORDER TO OBTAIN POLST. CONSERVATOR STATED THAT PATIENT AND CONSERVATOR HAVE A COURT DATE ON 
12/03/2019, IN ORDER TO UPDATE (RENEW) CONSERVATORSHIP AND OBTAIN POLST ORDER. CONSERVATOR 
ALSO MENTIONED THAT SHE WAS SENDING A CAREGIVER TO [facility] FOR PATIENT BY THE NAME OF MARTIN. 
DR CAMILO AQUINO INFORMED AND AWARE OF UPDATES.

## 2019-11-14 NOTE — NUR
Patient pulled out IV cath from right wrist.  IV cath is intact.  Nurse asked if she could 
insert new IV cath and patient declined.

## 2019-11-14 NOTE — NUR
Received patient in bed awake, A&Ox2. No SOB noted. Caregiver at bedside. Patient agreed to 
be on Tele monitor but refusing IV insertion and IVF, explained risks and benefits but still 
refused. Safety measures observed. Call light within reach

## 2019-11-14 NOTE — NUR
RECEIVED PT RESTING COMFORTABLY IN BED. NO SIGNS OF ACUTE DISTRESS OR SOB NOTED. BED LOCKED 
AND IN LOW POSITION. PT ON TELEMETRY UNIT. PER NOC RN: PT REFUSES TELE MONITORING BOX. PT 
HAS NO IV AS HE PULLED OUT IV. PT REFUSES REINSERTION OF IV. WILL CONTINUE TO MONITOR.

## 2019-11-14 NOTE — NUR
PT RESTING COMFORTABLY IN BED. SITTER AT BEDSIDE. NO ACUTE DISTRESS OR SOB NOTED. PT 
MEDICATION AND DIET COMPLIANT. BED LOCKED AND IN LOW POSITION. PT ON TELEMETRY MONITORING, 
HOWEVER PT REFUSES TO WEAR MONITOR. WILL GIVE REPORT ACCORDINGLY.

## 2019-11-15 VITALS — SYSTOLIC BLOOD PRESSURE: 160 MMHG | DIASTOLIC BLOOD PRESSURE: 79 MMHG

## 2019-11-15 VITALS — SYSTOLIC BLOOD PRESSURE: 127 MMHG | DIASTOLIC BLOOD PRESSURE: 63 MMHG

## 2019-11-15 VITALS — DIASTOLIC BLOOD PRESSURE: 69 MMHG | SYSTOLIC BLOOD PRESSURE: 137 MMHG

## 2019-11-15 VITALS — DIASTOLIC BLOOD PRESSURE: 71 MMHG | SYSTOLIC BLOOD PRESSURE: 141 MMHG

## 2019-11-15 VITALS — SYSTOLIC BLOOD PRESSURE: 150 MMHG | DIASTOLIC BLOOD PRESSURE: 70 MMHG

## 2019-11-15 LAB — IRON SERPL-MCNC: 12 UG/DL (ref 50–175)

## 2019-11-15 RX ADMIN — RENAGEL SCH MG: 800 TABLET ORAL at 17:18

## 2019-11-15 RX ADMIN — SENNOSIDES SCH TAB: 8.6 TABLET, COATED ORAL at 08:01

## 2019-11-15 RX ADMIN — RENAGEL SCH MG: 800 TABLET ORAL at 11:30

## 2019-11-15 RX ADMIN — Medication PRN LOZ: at 18:32

## 2019-11-15 RX ADMIN — HALOPERIDOL SCH MG: 2 TABLET ORAL at 23:15

## 2019-11-15 RX ADMIN — SENNOSIDES SCH TAB: 8.6 TABLET, COATED ORAL at 17:17

## 2019-11-15 RX ADMIN — Medication PRN LOZ: at 02:57

## 2019-11-15 RX ADMIN — NIFEDIPINE SCH MG: 30 TABLET, FILM COATED, EXTENDED RELEASE ORAL at 08:01

## 2019-11-15 RX ADMIN — Medication PRN LOZ: at 05:43

## 2019-11-15 RX ADMIN — RENAGEL SCH MG: 800 TABLET ORAL at 08:01

## 2019-11-15 RX ADMIN — ACETAMINOPHEN PRN MG: 325 TABLET ORAL at 08:37

## 2019-11-15 RX ADMIN — SODIUM CITRATE AND CITRIC ACID MONOHYDRATE SCH ML: 500; 334 SOLUTION ORAL at 08:01

## 2019-11-15 RX ADMIN — NIFEDIPINE SCH MG: 30 TABLET, FILM COATED, EXTENDED RELEASE ORAL at 17:00

## 2019-11-15 RX ADMIN — SODIUM CITRATE AND CITRIC ACID MONOHYDRATE SCH ML: 500; 334 SOLUTION ORAL at 17:18

## 2019-11-15 NOTE — NUR
Patient sitting up in chair, alert and oriented x2. Refusing cardiac monitor, Bekah Givens 
NP made aware. No IV access, patient refuses fluids. Noncompliant with nursing care. Seen by 
PT today, ambulatory with walker. Psych consult pending . Will endorse care to 
oncoming shift.

## 2019-11-15 NOTE — NUR
Patient slept intermittently. Patient removed his Tele monitor and refusing it, explained 
risks and benefits but still refused. Patient compliant w/ meds. All needs attended. Will 
endorse accordingly

## 2019-11-15 NOTE — NUR
Patient refusing BP check, refusing procardia medication. Charge nurse made aware, following 
up with

## 2019-11-15 NOTE — NUR
Patient received into care, sitting on side of the bed, with caregiver at bedside.  Patient 
is alert/oriented x3 and has no complaints of pain or discomfort at this time.  All safety 
and fall precaution measures are in place.  Call light and personal items are within reach 
at all times.  Will continue to monitor.

## 2019-11-15 NOTE — NUR
Patient refused prescribed PO haldol stating that "it kills people".  Nurse explained 
risks/benefits three times, patient still refused.

-------------------------------------------------------------------------------

Addendum: 11/16/19 at 0529 by GUILLERMINA CABRERA RN

-------------------------------------------------------------------------------

Notified house supervisor of patient's refusal to take prescribed PO Haldol.  House 
supervisor advised nurse to waste medication.

## 2019-11-16 VITALS — SYSTOLIC BLOOD PRESSURE: 151 MMHG | DIASTOLIC BLOOD PRESSURE: 68 MMHG

## 2019-11-16 VITALS — SYSTOLIC BLOOD PRESSURE: 137 MMHG | DIASTOLIC BLOOD PRESSURE: 68 MMHG

## 2019-11-16 VITALS — DIASTOLIC BLOOD PRESSURE: 75 MMHG | SYSTOLIC BLOOD PRESSURE: 140 MMHG

## 2019-11-16 RX ADMIN — SODIUM CITRATE AND CITRIC ACID MONOHYDRATE SCH ML: 500; 334 SOLUTION ORAL at 08:35

## 2019-11-16 RX ADMIN — NIFEDIPINE SCH MG: 30 TABLET, FILM COATED, EXTENDED RELEASE ORAL at 08:36

## 2019-11-16 RX ADMIN — FERROUS SULFATE TAB 325 MG (65 MG ELEMENTAL FE) SCH MG: 325 (65 FE) TAB at 08:35

## 2019-11-16 RX ADMIN — SENNOSIDES SCH TAB: 8.6 TABLET, COATED ORAL at 17:23

## 2019-11-16 RX ADMIN — SENNOSIDES SCH TAB: 8.6 TABLET, COATED ORAL at 08:34

## 2019-11-16 RX ADMIN — RENAGEL SCH MG: 800 TABLET ORAL at 08:34

## 2019-11-16 RX ADMIN — HALOPERIDOL SCH MG: 2 TABLET ORAL at 17:00

## 2019-11-16 RX ADMIN — SODIUM CITRATE AND CITRIC ACID MONOHYDRATE SCH ML: 500; 334 SOLUTION ORAL at 17:22

## 2019-11-16 RX ADMIN — HALOPERIDOL SCH MG: 2 TABLET ORAL at 12:51

## 2019-11-16 RX ADMIN — Medication PRN LOZ: at 22:00

## 2019-11-16 RX ADMIN — RENAGEL SCH MG: 800 TABLET ORAL at 12:31

## 2019-11-16 RX ADMIN — RENAGEL SCH MG: 800 TABLET ORAL at 17:23

## 2019-11-16 RX ADMIN — Medication PRN MG: at 22:00

## 2019-11-16 RX ADMIN — NIFEDIPINE SCH MG: 30 TABLET, FILM COATED, EXTENDED RELEASE ORAL at 17:24

## 2019-11-16 RX ADMIN — HALOPERIDOL SCH MG: 2 TABLET ORAL at 08:36

## 2019-11-16 NOTE — NUR
PT RESTING COMFORTABLY IN BED. DR AQUINO AWARE OF PT REFUSING HALDOL IN AM. NO ACUTE 
DISTRESS OR SOB NOTED. PT COOPERATIVE AND PLEASANT. BED LOCKED AND IN LOW POSITION. PT 
REFUSES TO WEAR TELE MONITOR. DR AQUINO AWARE. WILL CONTINUE TO MONITOR.

## 2019-11-16 NOTE — NUR
Patient refused AM VS with CNA.  Nurse explained to patient that he has prescribed blood 
pressure medication scheduled for 0600 and will need his blood pressure and heart rate in 
order to provide the medication safely.  Patient said he would take the blood pressure 
medication but will not allow either the CNA or nurse to take his VS.  Nurse advised patient 
unable to provide blood pressure medication without getting accurate blood pressure/heart 
rate through VS.  Patient declined again.  Nurse will withhold apresoline scheduled for 0600 
due to patient refusal to have VS measured.

## 2019-11-16 NOTE — NUR
RECEIVED PT RESTING COMFORTABLY IN BED. NO ACUTE DISTRESS OR SOB NOTED.BED LOCKED AND IN LOW 
POSITION. PT REFUSES TELE MONITORING BOX AT THIS TIME. WILL CONTINUE TO MONITOR.

## 2019-11-16 NOTE — NUR
PT RESTING COMFORTABLY IN BED. NO ACUTE DISTRESS NOTED. NO SOB NOTED. PT ALERT AND ORIENTED 
X3. PT IS PLEASANT. BED LOCKED AND IN LOW POSITION. WILL GIVE REPORT ACCORDINGLY.

## 2019-11-16 NOTE — NUR
Patient slept intermittently throughout night with no complaints of pain or discomfort.  
Patient is currently sitting up on side of bed and is alert/oriented x2-3.  Patient had 
periods of yelling outbursts and conversations with either self or visual/cognitive 
hallucinations.  Patient was compliant with HS VS and prescribed blood pressure medication 
but was noncompliant with new order for prescribed PO Haldol.  Patient refused AM VS thus 
prescribed AM PO apresoline was withheld for patient safety.  Patient is at times 
redirectable but has frequent angry outbursts.  All nursing needs were met promptly.  Call 
light and personal items are within reach at all times.  All safety and fall precaution 
measures remain in place.

## 2019-11-17 VITALS — SYSTOLIC BLOOD PRESSURE: 123 MMHG | DIASTOLIC BLOOD PRESSURE: 57 MMHG

## 2019-11-17 VITALS — DIASTOLIC BLOOD PRESSURE: 71 MMHG | SYSTOLIC BLOOD PRESSURE: 115 MMHG

## 2019-11-17 VITALS — SYSTOLIC BLOOD PRESSURE: 120 MMHG | DIASTOLIC BLOOD PRESSURE: 53 MMHG

## 2019-11-17 VITALS — SYSTOLIC BLOOD PRESSURE: 115 MMHG | DIASTOLIC BLOOD PRESSURE: 64 MMHG

## 2019-11-17 VITALS — SYSTOLIC BLOOD PRESSURE: 128 MMHG | DIASTOLIC BLOOD PRESSURE: 71 MMHG

## 2019-11-17 LAB
BASOPHILS # BLD AUTO: 0 K/UL (ref 0–8)
BASOPHILS NFR BLD AUTO: 0.6 % (ref 0–2)
BUN SERPL-MCNC: 96 MG/DL (ref 7–18)
CHLORIDE SERPL-SCNC: 96 MMOL/L (ref 98–107)
CO2 SERPL-SCNC: 21 MMOL/L (ref 21–32)
CREAT SERPL-MCNC: 8.8 MG/DL (ref 0.6–1.3)
EOSINOPHIL # BLD AUTO: 0.2 K/UL (ref 0–0.7)
EOSINOPHIL NFR BLD AUTO: 3.1 % (ref 0–7)
EOSINOPHIL NFR BLD MANUAL: 6 % (ref 0–8)
GLUCOSE SERPL-MCNC: 91 MG/DL (ref 74–106)
HCT VFR BLD AUTO: 21.3 % (ref 36.7–47.1)
HGB BLD-MCNC: 7.1 G/DL (ref 12.5–16.3)
LYMPHOCYTES # BLD AUTO: 1.7 K/UL (ref 20–40)
LYMPHOCYTES NFR BLD AUTO: 23.9 % (ref 20.5–51.5)
LYMPHOCYTES NFR BLD MANUAL: 19 % (ref 20–40)
MAGNESIUM SERPL-MCNC: 2.9 MG/DL (ref 1.8–2.4)
MCH RBC QN AUTO: 26.5 UUG (ref 23.8–33.4)
MCHC RBC AUTO-ENTMCNC: 33 G/DL (ref 32.5–36.3)
MCV RBC AUTO: 79.9 FL (ref 73–96.2)
MONOCYTES # BLD AUTO: 0.5 K/UL (ref 2–10)
MONOCYTES NFR BLD AUTO: 7.2 % (ref 0–11)
MONOCYTES NFR BLD MANUAL: 7 % (ref 2–10)
NEUTROPHILS # BLD AUTO: 4.7 K/UL (ref 1.8–8.9)
NEUTROPHILS NFR BLD AUTO: 65.2 % (ref 38.5–71.5)
NEUTS SEG NFR BLD MANUAL: 68 % (ref 42–75)
PHOSPHATE SERPL-MCNC: 3.7 MG/DL (ref 2.5–4.9)
PLATELET # BLD AUTO: 426 K/UL (ref 152–348)
POTASSIUM SERPL-SCNC: 4.9 MMOL/L (ref 3.5–5.1)
RBC # BLD AUTO: 2.66 MIL/UL (ref 4.06–5.63)
WBC # BLD AUTO: 7.3 K/UL (ref 3.6–10.2)

## 2019-11-17 RX ADMIN — SODIUM CITRATE AND CITRIC ACID MONOHYDRATE SCH ML: 500; 334 SOLUTION ORAL at 16:35

## 2019-11-17 RX ADMIN — SENNOSIDES SCH TAB: 8.6 TABLET, COATED ORAL at 10:11

## 2019-11-17 RX ADMIN — HALOPERIDOL SCH MG: 2 TABLET ORAL at 10:12

## 2019-11-17 RX ADMIN — HALOPERIDOL SCH MG: 2 TABLET ORAL at 12:28

## 2019-11-17 RX ADMIN — Medication PRN LOZ: at 03:24

## 2019-11-17 RX ADMIN — SENNOSIDES SCH TAB: 8.6 TABLET, COATED ORAL at 16:34

## 2019-11-17 RX ADMIN — NIFEDIPINE SCH MG: 30 TABLET, FILM COATED, EXTENDED RELEASE ORAL at 10:11

## 2019-11-17 RX ADMIN — RENAGEL SCH MG: 800 TABLET ORAL at 16:36

## 2019-11-17 RX ADMIN — SODIUM CITRATE AND CITRIC ACID MONOHYDRATE SCH ML: 500; 334 SOLUTION ORAL at 10:12

## 2019-11-17 RX ADMIN — NIFEDIPINE SCH MG: 30 TABLET, FILM COATED, EXTENDED RELEASE ORAL at 16:34

## 2019-11-17 RX ADMIN — RENAGEL SCH MG: 800 TABLET ORAL at 12:28

## 2019-11-17 RX ADMIN — RENAGEL SCH MG: 800 TABLET ORAL at 08:00

## 2019-11-17 RX ADMIN — Medication PRN LOZ: at 05:53

## 2019-11-17 RX ADMIN — SODIUM CHLORIDE SCH MLS/HR: 9 INJECTION, SOLUTION INTRAVENOUS at 14:00

## 2019-11-17 RX ADMIN — HALOPERIDOL SCH MG: 2 TABLET ORAL at 16:34

## 2019-11-17 NOTE — NUR
RECEIVED PT AWAKE, ALERT AND ORIENTEDX3. PT SHOWS NO SIGNS OF ACUTE DISTRESS. PT REFUSED TO 
HAVE IV ACCESS. SAFETY AND COMFORT PROVIDED. WILL CONTINUE TO MONITOR.

## 2019-11-17 NOTE — NUR
SHIFT OFF REPORT TO BILL DEL RIO. PT IN NO CUTE DISTRESS. PT COMPLIANT WITH HIS MEDICATION. 
SAFETY AND COMFORT PROVIDED.

## 2019-11-17 NOTE — NUR
patient is in his room fully dressed and voicing a plan to go home. Attempts to explain the 
need to be medically cleared by the doctor were met with hostility and agitation. Security 
was notified of the high AWOL risk.

## 2019-11-18 VITALS — SYSTOLIC BLOOD PRESSURE: 119 MMHG | DIASTOLIC BLOOD PRESSURE: 61 MMHG

## 2019-11-18 VITALS — SYSTOLIC BLOOD PRESSURE: 104 MMHG | DIASTOLIC BLOOD PRESSURE: 73 MMHG

## 2019-11-18 VITALS — SYSTOLIC BLOOD PRESSURE: 129 MMHG | DIASTOLIC BLOOD PRESSURE: 65 MMHG

## 2019-11-18 VITALS — SYSTOLIC BLOOD PRESSURE: 126 MMHG | DIASTOLIC BLOOD PRESSURE: 66 MMHG

## 2019-11-18 VITALS — DIASTOLIC BLOOD PRESSURE: 66 MMHG | SYSTOLIC BLOOD PRESSURE: 126 MMHG

## 2019-11-18 RX ADMIN — NIFEDIPINE SCH MG: 30 TABLET, FILM COATED, EXTENDED RELEASE ORAL at 08:03

## 2019-11-18 RX ADMIN — FERROUS SULFATE TAB 325 MG (65 MG ELEMENTAL FE) SCH MG: 325 (65 FE) TAB at 08:03

## 2019-11-18 RX ADMIN — HALOPERIDOL SCH MG: 2 TABLET ORAL at 12:23

## 2019-11-18 RX ADMIN — SENNOSIDES SCH TAB: 8.6 TABLET, COATED ORAL at 08:03

## 2019-11-18 RX ADMIN — HALOPERIDOL SCH MG: 2 TABLET ORAL at 17:58

## 2019-11-18 RX ADMIN — RENAGEL SCH MG: 800 TABLET ORAL at 12:22

## 2019-11-18 RX ADMIN — RENAGEL SCH MG: 800 TABLET ORAL at 17:58

## 2019-11-18 RX ADMIN — RENAGEL SCH MG: 800 TABLET ORAL at 08:02

## 2019-11-18 RX ADMIN — HALOPERIDOL SCH MG: 2 TABLET ORAL at 08:07

## 2019-11-18 RX ADMIN — SODIUM CITRATE AND CITRIC ACID MONOHYDRATE SCH ML: 500; 334 SOLUTION ORAL at 17:59

## 2019-11-18 RX ADMIN — SENNOSIDES SCH TAB: 8.6 TABLET, COATED ORAL at 17:58

## 2019-11-18 RX ADMIN — SODIUM CITRATE AND CITRIC ACID MONOHYDRATE SCH ML: 500; 334 SOLUTION ORAL at 08:07

## 2019-11-18 RX ADMIN — NIFEDIPINE SCH MG: 30 TABLET, FILM COATED, EXTENDED RELEASE ORAL at 17:59

## 2019-11-18 RX ADMIN — SODIUM CHLORIDE SCH MLS/HR: 9 INJECTION, SOLUTION INTRAVENOUS at 13:35

## 2019-11-18 NOTE — NUR
patient refused blood draw, perseveres about sending his blood clots from the nose to the 
lab for examination. Took hydralazine. No distress noted.

## 2019-11-18 NOTE — NUR
PATIENT IS FOR DISCHARGE HAS NO IV SITE AND REFUSED TO HAVE ONE PLACED SEEN TODAY BY CAMILO 
WITH [id number]  STATED THAT PATIENT WILL BE PICKED UP BY MARTIN MONROY BETWEEN 5 
AND 6 PATIENT AWARE.

## 2019-11-18 NOTE — NUR
patient is blowing his nose, mucus is blood tinged. Insists on a microscopic analysis of the 
"blood clots from the right side of the brain".

## 2019-11-18 NOTE — NUR
PATIENT DISCHARGED PICKED UP BY MARTIN IN SATISFACTORY CONDITION WITH DISCHARGE 
INSTRUCTIONS AND PRESCRIPTION AND MARTIN WAS INSTRUCTED THAT PATIENT SHOULD FOLLOW UP WITH 
HIS PRIMARY DOCTOR AND A NEPHROLOGIST FOR HEMODIALYSIS AND SHE EXPRESSED UNDERSTANDING.